# Patient Record
Sex: MALE | Race: AMERICAN INDIAN OR ALASKA NATIVE
[De-identification: names, ages, dates, MRNs, and addresses within clinical notes are randomized per-mention and may not be internally consistent; named-entity substitution may affect disease eponyms.]

---

## 2020-11-24 ENCOUNTER — HOSPITAL ENCOUNTER (OUTPATIENT)
Dept: HOSPITAL 5 - XRAY | Age: 45
Discharge: HOME | End: 2020-11-24
Attending: ORTHOPAEDIC SURGERY
Payer: COMMERCIAL

## 2020-11-24 DIAGNOSIS — M48.02: Primary | ICD-10-CM

## 2020-11-24 DIAGNOSIS — M25.569: ICD-10-CM

## 2020-11-24 DIAGNOSIS — M54.2: ICD-10-CM

## 2020-11-24 DIAGNOSIS — M16.12: ICD-10-CM

## 2020-11-24 DIAGNOSIS — M25.559: ICD-10-CM

## 2020-11-24 DIAGNOSIS — M54.5: ICD-10-CM

## 2020-11-24 PROCEDURE — 73565 X-RAY EXAM OF KNEES: CPT

## 2020-11-24 PROCEDURE — 72170 X-RAY EXAM OF PELVIS: CPT

## 2020-11-24 PROCEDURE — 72040 X-RAY EXAM NECK SPINE 2-3 VW: CPT

## 2020-11-24 PROCEDURE — 72100 X-RAY EXAM L-S SPINE 2/3 VWS: CPT

## 2020-11-24 NOTE — XRAY REPORT
PELVIS ONE VIEW



INDICATION / CLINICAL INFORMATION:

PELVIC PAIN.



COMPARISON:

None available.



FINDINGS:

Mild degenerative changes seen in the left hip joint. There may be an old healed inferior left pubic 
ramus fracture. Surgical hardware is seen across the sacroiliac joints. No other significant skeletal
 abnormality



Signer Name: Prem Goss MD FACLAKSHMI 

Signed: 11/24/2020 1:17 PM

Workstation Name: [email or web address]

## 2020-11-24 NOTE — XRAY REPORT
LUMBAR SPINE 5 VIEWS



INDICATION / CLINICAL INFORMATION:

BACK PAIN.



COMPARISON:

None available.



FINDINGS:

Surgical hardware is present across the sacroiliac joints. No other significant skeletal abnormality.
 Alignment is normal.



Signer Name: Prem Goss MD FACR 

Signed: 11/24/2020 1:19 PM

Workstation Name: [email or web address]

## 2020-11-24 NOTE — XRAY REPORT
BILATERAL STANDING KNEES ONE VIEW



INDICATION / CLINICAL INFORMATION:

PAIN IN UNSPECIFIED KNEE.



COMPARISON:

None available.



FINDINGS:

No significant skeletal abnormality



Signer Name: Prem Goss MD FACR 

Signed: 11/24/2020 1:19 PM

Workstation Name: [email or web address]

## 2020-11-24 NOTE — XRAY REPORT
CERVICAL SPINE 6 VIEWS



INDICATION / CLINICAL INFORMATION:

BACK PAIN.



COMPARISON:

None available.



FINDINGS:

Narrowing of the C6-7 disc space. Mild narrowing of the C4-5 disc space. No other significant skeleta
l abnormality. Alignment is normal.



Signer Name: Prem Goss MD FACLAKSHMI 

Signed: 11/24/2020 1:20 PM

Workstation Name: [email or web address]